# Patient Record
Sex: MALE | URBAN - METROPOLITAN AREA
[De-identification: names, ages, dates, MRNs, and addresses within clinical notes are randomized per-mention and may not be internally consistent; named-entity substitution may affect disease eponyms.]

---

## 2021-11-11 ENCOUNTER — OFFICE VISIT (OUTPATIENT)
Dept: URBAN - METROPOLITAN AREA CLINIC 44 | Facility: CLINIC | Age: 41
End: 2021-11-11
Payer: COMMERCIAL

## 2021-11-11 DIAGNOSIS — H40.053 OCULAR HYPERTENSION, BILATERAL: ICD-10-CM

## 2021-11-11 DIAGNOSIS — M32.10 SYSTEMIC LUPUS ERYTHEMATOSUS, ORGAN OR SYSTEM INVOLVEMENT UNSPECIFIED: ICD-10-CM

## 2021-11-11 DIAGNOSIS — H53.121 TRANSIENT VISUAL LOSS, RIGHT EYE: ICD-10-CM

## 2021-11-11 DIAGNOSIS — Z79.899 OTHER LONG TERM (CURRENT) DRUG THERAPY: Primary | ICD-10-CM

## 2021-11-11 PROCEDURE — 92004 COMPRE OPH EXAM NEW PT 1/>: CPT | Performed by: OPTOMETRIST

## 2021-11-11 PROCEDURE — 92133 CPTRZD OPH DX IMG PST SGM ON: CPT | Performed by: OPTOMETRIST

## 2021-11-11 PROCEDURE — 92134 CPTRZ OPH DX IMG PST SGM RTA: CPT | Performed by: OPTOMETRIST

## 2021-11-11 ASSESSMENT — VISUAL ACUITY
OD: 20/20
OS: 20/20

## 2021-11-11 ASSESSMENT — INTRAOCULAR PRESSURE
OD: 23
OS: 25

## 2021-11-11 ASSESSMENT — KERATOMETRY
OS: 47.00
OD: 47.00

## 2021-11-11 NOTE — IMPRESSION/PLAN
Impression: Systemic lupus erythematosus, organ or system involvement unspecified: M32.10.  Plan: PLAN: See C60.588

## 2021-11-11 NOTE — IMPRESSION/PLAN
Impression: Ocular hypertension, bilateral: H40.053. Mild elevated IOP noted per exam. Vertical cupping . 40 OD, .20 OS with RNFL Average 90 OD, 90 OS, - Fm Hx. Plan: PLAN: See F03.479. Consider RTC 6  months if IOP elevated.

## 2021-11-11 NOTE — IMPRESSION/PLAN
Impression: Transient visual loss, right eye: H53.121. Hx of vision loss OD. Reports it has happened a couple times per year. Episodes last about 15 min and reports vision is totally black and eye pain. Diagnosed with APS. Per patient imaging negative and taking Xarelto Plan: PLAN: Discussed findings. Ocular health normal except mild IOP elevation. Continue to F/U with PCP. Rec ER if further episodes occur.

## 2021-11-11 NOTE — IMPRESSION/PLAN
Impression: Other long term (current) drug therapy: Z79.899. Per exam and OCT no indication of Plaquenil toxicity. Patient risk low based on current dosage and clinical findings. No Hx of kidney disorders. Central fields normal OU. OK to continue with medication as indicated by prescribing doctor.  Plan: PLAN: RTC for 10-2 VF and IOP check + final plaquenil report